# Patient Record
Sex: FEMALE | URBAN - METROPOLITAN AREA
[De-identification: names, ages, dates, MRNs, and addresses within clinical notes are randomized per-mention and may not be internally consistent; named-entity substitution may affect disease eponyms.]

---

## 2018-11-12 ENCOUNTER — IMPORTED ENCOUNTER (OUTPATIENT)
Dept: URBAN - METROPOLITAN AREA CLINIC 9 | Facility: CLINIC | Age: 78
End: 2018-11-12

## 2021-10-15 ASSESSMENT — KERATOMETRY
OD_AXISANGLE_DEGREES: 126
OD_K1POWER_DIOPTERS: 41.25
OD_AXISANGLE2_DEGREES: 36
OS_K1POWER_DIOPTERS: 40.25
OS_K2POWER_DIOPTERS: 41.25
OS_AXISANGLE2_DEGREES: 129
OS_AXISANGLE_DEGREES: 39
OD_K2POWER_DIOPTERS: 41.5

## 2021-10-15 ASSESSMENT — VISUAL ACUITY
OD_SC: 20/60 +2 SN
OS_SC: 20/80 SN
OD_CC: 20/40 SN
OS_PH: 20/40 SN
OS_CC: 20/30 SN

## 2021-10-15 ASSESSMENT — TONOMETRY
OS_IOP_MMHG: 18
OD_IOP_MMHG: 17

## 2022-02-16 NOTE — PATIENT DISCUSSION
DISCUSSED IOL OPTIONS RECOMMEND STANDARD IOL OD. PT DESIRES STD IOL OD. Isotretinoin Pregnancy And Lactation Text: This medication is Pregnancy Category X and is considered extremely dangerous during pregnancy. It is unknown if it is excreted in breast milk.